# Patient Record
(demographics unavailable — no encounter records)

---

## 2025-05-07 NOTE — DISCUSSION/SUMMARY
[de-identified] : Discussed findings of today's exam and possible causes of patient's pain.  Educated patient on their most probable diagnosis of chronic non-radicular low back pain due to SI joint dysfunction.  Reviewed possible courses of treatment, and we collaboratively decided best course of treatment at this time will include conservative management.  Patient will be started on a course of physical therapy to restore normal range of motion and strength as tolerated.  Patient has no reported injury or trauma, no radicular features, no need for imaging at this time.  Patient lives in a group home, he was accompanied to the office today by his health aide.  Follow up as needed.  Patient appreciates and agrees with current plan.  This note was generated using dragon medical dictation software.  A reasonable effort has been made for proofreading its contents, but typos may still remain.  If there are any questions or points of clarification needed please notify my office.

## 2025-05-07 NOTE — PHYSICAL EXAM
[de-identified] : Constitutional: Well-nourished, well-developed, No acute distress Respiratory:  Good respiratory effort, no SOB Lymphatic: No regional lymphadenopathy, no lymphedema Psychiatric: Pleasant and normal affect, alert and oriented x3 Musculoskeletal: normal except where as noted in regional exam  Lumbar Spine Exam  APPEARANCE: no marked deformities or malalignment, + loss of lordotic curvature of the lumbosacral spine. + poor posture POSITIVE TENDERNESS: + IL/SI/ST ligs, + TTP of b/l SI joints, + b/l lower lumbar tenderness and spasm noted in erector spinae and quadratus lumborum NONTENDER: no bony midline tenderness. ROM: Mildly limited in all directions, mildly painful at end range of flexion and extension RESISTIVE TESTING: painful 4/5 resisted flex/ext, sidebending b/l, and rotation SPECIAL TESTS: neg SLR b/l, neg CHARU b/l, neg Trendelenburg b/l  PULSES: 2+ DP/PT pulses NEURO:  L1 - S2 intact to sensation and motor, DTRs 2+/4 patella and achilles

## 2025-05-07 NOTE — HISTORY OF PRESENT ILLNESS
[de-identified] : Mr. POLINA RAMIREZ is a 52 year old gentleman presenting for evaluation of chronic low back pain. Denies radicular symptoms. Works as a  and notes the pain is worse with bending and lifting. Walking around helps the pain. He has tried taking Tylenol and Advil for the pain with no relief. He has tried PT in the past for similar pain with no relief. He states that [facility] which only provided temporary relief.